# Patient Record
Sex: FEMALE | Race: WHITE | NOT HISPANIC OR LATINO | Employment: UNEMPLOYED | URBAN - METROPOLITAN AREA
[De-identification: names, ages, dates, MRNs, and addresses within clinical notes are randomized per-mention and may not be internally consistent; named-entity substitution may affect disease eponyms.]

---

## 2022-12-31 ENCOUNTER — APPOINTMENT (EMERGENCY)
Dept: CT IMAGING | Facility: HOSPITAL | Age: 37
End: 2022-12-31

## 2022-12-31 ENCOUNTER — HOSPITAL ENCOUNTER (EMERGENCY)
Facility: HOSPITAL | Age: 37
Discharge: HOME | End: 2022-12-31
Attending: EMERGENCY MEDICINE

## 2022-12-31 VITALS
SYSTOLIC BLOOD PRESSURE: 115 MMHG | DIASTOLIC BLOOD PRESSURE: 71 MMHG | TEMPERATURE: 99.4 F | OXYGEN SATURATION: 100 % | RESPIRATION RATE: 18 BRPM | HEART RATE: 75 BPM

## 2022-12-31 DIAGNOSIS — R45.1 AGITATION: Primary | ICD-10-CM

## 2022-12-31 LAB
ALBUMIN SERPL BCP-MCNC: 3.2 G/DL (ref 3.5–5)
ALP SERPL-CCNC: 80 U/L (ref 46–116)
ALT SERPL W P-5'-P-CCNC: 27 U/L (ref 12–78)
ANION GAP SERPL CALCULATED.3IONS-SCNC: 7 MMOL/L (ref 4–13)
AST SERPL W P-5'-P-CCNC: 18 U/L (ref 5–45)
BASOPHILS # BLD AUTO: 0.03 THOUSANDS/ÂΜL (ref 0–0.1)
BASOPHILS NFR BLD AUTO: 1 % (ref 0–1)
BILIRUB DIRECT SERPL-MCNC: 0.06 MG/DL (ref 0–0.2)
BILIRUB SERPL-MCNC: 0.2 MG/DL (ref 0.2–1)
BUN SERPL-MCNC: 13 MG/DL (ref 5–25)
CALCIUM SERPL-MCNC: 8.2 MG/DL (ref 8.3–10.1)
CHLORIDE SERPL-SCNC: 108 MMOL/L (ref 96–108)
CO2 SERPL-SCNC: 29 MMOL/L (ref 21–32)
CREAT SERPL-MCNC: 0.91 MG/DL (ref 0.6–1.3)
EOSINOPHIL # BLD AUTO: 0.23 THOUSAND/ÂΜL (ref 0–0.61)
EOSINOPHIL NFR BLD AUTO: 4 % (ref 0–6)
ERYTHROCYTE [DISTWIDTH] IN BLOOD BY AUTOMATED COUNT: 14.5 % (ref 11.6–15.1)
ETHANOL SERPL-MCNC: <3 MG/DL (ref 0–3)
FLUAV RNA RESP QL NAA+PROBE: NEGATIVE
FLUBV RNA RESP QL NAA+PROBE: NEGATIVE
GFR SERPL CREATININE-BSD FRML MDRD: 82 ML/MIN/1.73SQ M
GLUCOSE SERPL-MCNC: 89 MG/DL (ref 65–140)
HCT VFR BLD AUTO: 34.5 % (ref 34.8–46.1)
HGB BLD-MCNC: 11 G/DL (ref 11.5–15.4)
IMM GRANULOCYTES # BLD AUTO: 0.01 THOUSAND/UL (ref 0–0.2)
IMM GRANULOCYTES NFR BLD AUTO: 0 % (ref 0–2)
LYMPHOCYTES # BLD AUTO: 1.57 THOUSANDS/ÂΜL (ref 0.6–4.47)
LYMPHOCYTES NFR BLD AUTO: 30 % (ref 14–44)
MAGNESIUM SERPL-MCNC: 1.7 MG/DL (ref 1.6–2.6)
MCH RBC QN AUTO: 31 PG (ref 26.8–34.3)
MCHC RBC AUTO-ENTMCNC: 31.9 G/DL (ref 31.4–37.4)
MCV RBC AUTO: 97 FL (ref 82–98)
MONOCYTES # BLD AUTO: 0.5 THOUSAND/ÂΜL (ref 0.17–1.22)
MONOCYTES NFR BLD AUTO: 10 % (ref 4–12)
NEUTROPHILS # BLD AUTO: 2.84 THOUSANDS/ÂΜL (ref 1.85–7.62)
NEUTS SEG NFR BLD AUTO: 55 % (ref 43–75)
NRBC BLD AUTO-RTO: 0 /100 WBCS
PLATELET # BLD AUTO: 254 THOUSANDS/UL (ref 149–390)
PMV BLD AUTO: 9.2 FL (ref 8.9–12.7)
POTASSIUM SERPL-SCNC: 3.9 MMOL/L (ref 3.5–5.3)
PROT SERPL-MCNC: 6.7 G/DL (ref 6.4–8.4)
RBC # BLD AUTO: 3.55 MILLION/UL (ref 3.81–5.12)
RSV RNA RESP QL NAA+PROBE: NEGATIVE
SARS-COV-2 RNA RESP QL NAA+PROBE: NEGATIVE
SODIUM SERPL-SCNC: 144 MMOL/L (ref 135–147)
WBC # BLD AUTO: 5.18 THOUSAND/UL (ref 4.31–10.16)

## 2022-12-31 NOTE — ED NOTES
Patient provided the following #'s:    Elinor Madden - [address] [facility] ([place], [place]) (Nurse) - [phone]  Monico (Director) - [phone]  Graciela Barboza (Behavioral Specialist) - [phone]

## 2022-12-31 NOTE — ED PROVIDER NOTES
History  Chief Complaint   Patient presents with   • Psychiatric Evaluation     Per EMS, pt ran away from group home and found [place] side of highway  Pt oriented on arrival and reports SI without a plan without HI/AH/VH  Hx schizophrenia  • Altered Mental Status     Per reports from EMS pt completely oriented until ambulance arrived  Pt disoriented x4 and stating, "I dont know any of your questions  I am very confused"  31 YO female from [facility] presents with possible confusion  Per EMS patient was found on the side of the highway, was conversational with crew until she was brought aboard the ambulance, at which time she stated she was confused and did not answer questions, stating she did not remember  On initial physician evaluation patient offers no specific complaints, she is not answering questions, states she does not remember what she was doing today, does not know who she is or where she is, cannot recall falling  Patient has admitted to suicidality but no plan, unsuer regarding history of suicide attempt  History provided by:  Patient and EMS personnel   used: No    Psychiatric Evaluation  Presenting symptoms: agitation    Degree of incapacity (severity): Moderate  Onset quality:  Unable to specify  Progression:  Unable to specify  Relieved by:  Nothing  Exacerbated by: interactions at group home  Associated symptoms: no abdominal pain, no chest pain and no fatigue        None       History reviewed  No pertinent past medical history  History reviewed  No pertinent surgical history  History reviewed  No pertinent family history  I have reviewed and agree with the history as documented  E-Cigarette/Vaping     E-Cigarette/Vaping Substances          Review of Systems   Constitutional: Negative for chills, fatigue and fever  HENT: Negative for dental problem  Eyes: Negative for visual disturbance  Respiratory: Negative for shortness of breath  Cardiovascular: Negative for chest pain  Gastrointestinal: Negative for abdominal pain, diarrhea and vomiting  Genitourinary: Negative for dysuria and frequency  Musculoskeletal: Negative for arthralgias  Skin: Negative for rash  Neurological: Negative for dizziness, weakness and light-headedness  Psychiatric/Behavioral: Positive for agitation  Negative for behavioral problems and confusion  All other systems reviewed and are negative  Physical Exam  Physical Exam  Vitals and nursing note reviewed  Constitutional:       Appearance: Normal appearance  HENT:      Head: Normocephalic and atraumatic  Eyes:      Extraocular Movements: Extraocular movements intact  Conjunctiva/sclera: Conjunctivae normal    Cardiovascular:      Rate and Rhythm: Normal rate  Pulmonary:      Effort: Pulmonary effort is normal    Abdominal:      General: There is no distension  Musculoskeletal:         General: Normal range of motion  Cervical back: Normal range of motion  Skin:     Findings: No rash  Neurological:      General: No focal deficit present  Mental Status: She is alert  Cranial Nerves: No cranial nerve deficit     Psychiatric:         Mood and Affect: Mood normal          Vital Signs  ED Triage Vitals [12/31/22 1508]   Temperature Pulse Respirations Blood Pressure SpO2   99 4 °F (37 4 °C) 89 16 137/80 99 %      Temp Source Heart Rate Source Patient Position - Orthostatic VS BP Location FiO2 (%)   Oral Monitor Lying Right arm --      Pain Score       No Pain           Vitals:    12/31/22 1508 12/31/22 1600 12/31/22 1625   BP: 137/80 112/69 115/71   Pulse: 89 72 75   Patient Position - Orthostatic VS: Lying Lying Lying         Visual Acuity      ED Medications  Medications - No data to display    Diagnostic Studies  Results Reviewed     Procedure Component Value Units Date/Time    FLU/RSV/COVID - if FLU/RSV clinically relevant [[id number]]  (Normal) Collected: 12/31/22 7335 Lab Status: Final result Specimen: Nares from Nasopharyngeal Swab Updated: 12/31/22 1652     SARS-CoV-2 Negative     INFLUENZA A PCR Negative     INFLUENZA B PCR Negative     RSV PCR Negative    Narrative:      FOR PEDIATRIC PATIENTS - copy/paste COVID Guidelines URL to browser: [email or web address]    SARS-CoV-2 assay is a Nucleic Acid Amplification assay intended for the  qualitative detection of nucleic acid from SARS-CoV-2 in nasopharyngeal  swabs  Results are for the presumptive identification of SARS-CoV-2 RNA  Positive results are indicative of infection with SARS-CoV-2, the virus  causing COVID-19, but do not rule out bacterial infection or co-infection  with other viruses  Laboratories within the [place] and its  territories are required to report all positive results to the appropriate  public health authorities  Negative results do not preclude SARS-CoV-2  infection and should not be used as the sole basis for treatment or other  patient management decisions  Negative results must be combined with  clinical observations, patient history, and epidemiological information  This test has not been FDA cleared or approved  This test has been authorized by FDA under an Emergency Use Authorization  (EUA)  This test is only authorized for the duration of time the  declaration that circumstances exist justifying the authorization of the  emergency use of an in vitro diagnostic tests for detection of SARS-CoV-2  virus and/or diagnosis of COVID-19 infection under section 564(b)(1) of  the Act, 21 U  S C  157EGS-7(C)(7), unless the authorization is terminated  or revoked sooner  The test has been validated but independent review by FDA  and CLIA is pending  Test performed using [employer] GeneXpert: This RT-PCR assay targets N2,  a region unique to SARS-CoV-2   A conserved region in the E-gene was chosen  for pan-Sarbecovirus detection which includes SARS-CoV-2  According to CMS-2020-01-R, this platform meets the definition of high-throughput technology      Ethanol [[id number]]  (Normal) Collected: 12/31/22 1550    Lab Status: Final result Specimen: Blood from Arm, Right Updated: 12/31/22 1632     Ethanol Lvl <3 mg/dL     Basic metabolic panel [[id number]]  (Abnormal) Collected: 12/31/22 1550    Lab Status: Final result Specimen: Blood from Arm, Right Updated: 12/31/22 1619     Sodium 144 mmol/L      Potassium 3 9 mmol/L      Chloride 108 mmol/L      CO2 29 mmol/L      ANION GAP 7 mmol/L      BUN 13 mg/dL      Creatinine 0 91 mg/dL      Glucose 89 mg/dL      Calcium 8 2 mg/dL      eGFR 82 ml/min/1 73sq m     Narrative:      [place] guidelines for Chronic Kidney Disease (CKD):   •  Stage 1 with normal or high GFR (GFR > 90 mL/min/1 73 square meters)  •  Stage 2 Mild CKD (GFR = 60-89 mL/min/1 73 square meters)  •  Stage 3A Moderate CKD (GFR = 45-59 mL/min/1 73 square meters)  •  Stage 3B Moderate CKD (GFR = 30-44 mL/min/1 73 square meters)  •  Stage 4 Severe CKD (GFR = 15-29 mL/min/1 73 square meters)  •  Stage 5 End Stage CKD (GFR <15 mL/min/1 73 square meters)  Note: GFR calculation is accurate only with a steady state creatinine    Hepatic function panel [[id number]]  (Abnormal) Collected: 12/31/22 1550    Lab Status: Final result Specimen: Blood from Arm, Right Updated: 12/31/22 1619     Total Bilirubin 0 20 mg/dL      Bilirubin, Direct 0 06 mg/dL      Alkaline Phosphatase 80 U/L      AST 18 U/L      ALT 27 U/L      Total Protein 6 7 g/dL      Albumin 3 2 g/dL     Magnesium [[id number]]  (Normal) Collected: 12/31/22 1550    Lab Status: Final result Specimen: Blood from Arm, Right Updated: 12/31/22 1619     Magnesium 1 7 mg/dL     CBC and differential [[id number]]  (Abnormal) Collected: 12/31/22 1550    Lab Status: Final result Specimen: Blood from Arm, Right Updated: 12/31/22 1600     WBC 5 18 Thousand/uL      RBC 3 55 Million/uL Hemoglobin 11 0 g/dL      Hematocrit 34 5 %      MCV 97 fL      MCH 31 0 pg      MCHC 31 9 g/dL      RDW 14 5 %      MPV 9 2 fL      Platelets 622 Thousands/uL      nRBC 0 /100 WBCs      Neutrophils Relative 55 %      Immat GRANS % 0 %      Lymphocytes Relative 30 %      Monocytes Relative 10 %      Eosinophils Relative 4 %      Basophils Relative 1 %      Neutrophils Absolute 2 84 Thousands/µL      Immature Grans Absolute 0 01 Thousand/uL      Lymphocytes Absolute 1 57 Thousands/µL      Monocytes Absolute 0 50 Thousand/µL      Eosinophils Absolute 0 23 Thousand/µL      Basophils Absolute 0 03 Thousands/µL     Rapid drug screen, urine [[id number]]     Lab Status: No result Specimen: Urine                  CT head without contrast   Final Result by Dillan Mcdonald MD (12/31 1651)      No acute intracranial abnormality  Workstation performed: [id number]                    Procedures  Procedures         ED Course  ED Course as of 12/31/22 1737   Sat Dec 31, 2022   1612 Group home representative calling, stating she refused her medications this morning and walked out of the group home, at that time EMS was called to pick her up     [phone] with group home representative  Patient has expressed SI, despite this group home representative states he is capable of caring for the patient and comfortable with discharge  SBIRT 20yo+    Flowsheet Row Most Recent Value   SBIRT (25 yo +)    In order to provide better care to our patients, we are screening all of our patients for alcohol and drug use  Would it be okay to ask you these screening questions? Yes Filed at: 12/31/2022 1634   Initial Alcohol Screen: US AUDIT-C     1  How often do you have a drink containing alcohol? 0 Filed at: 12/31/2022 1634   2  How many drinks containing alcohol do you have on a typical day you are drinking? 0 Filed at: 12/31/2022 1634   3a  Male UNDER 65:  How often do you have five or more drinks on one occasion? 0 Filed at: 12/31/2022 1634   3b  FEMALE Any Age, or MALE 65+: How often do you have 4 or more drinks on one occassion? 0 Filed at: 12/31/2022 1634   Audit-C Score 0 Filed at: 12/31/2022 1634   TIANA: How many times in the past year have you    Used an illegal drug or used a prescription medication for non-medical reasons? Never Filed at: 12/31/2022 1634                    MDM  Number of Diagnoses or Management Options  Agitation: new and requires workup  Diagnosis management comments: 1  Confusion - Patient appears unwilling to give information, she is conversational but when asked questions states she does not know  No records in system  Will try to contact group home, will check CBC for anemia and leukocytosis, metabolic panel for electrolyte abnormalities and dehydration,  LFT's to assess GB dysfunction, lipase for pancreatitis, UDS, alcohol, CT head  Amount and/or Complexity of Data Reviewed  Clinical lab tests: ordered and reviewed  Tests in the radiology section of CPT®: reviewed and ordered  Obtain history from someone other than the patient: yes  Independent visualization of images, tracings, or specimens: yes        Disposition  Final diagnoses:   Agitation     Time reflects when diagnosis was documented in both MDM as applicable and the Disposition within this note     Time User Action Codes Description Comment    12/31/2022  5:04 PM [place]oir Officer Add [R45 1] Agitation       ED Disposition     ED Disposition   Discharge    Condition   Stable    Date/Time   Sat Dec 31, 2022  5:04 PM    Comment   Cecile Guzmán discharge to home/self care  Follow-up Information    None         There are no discharge medications for this patient  No discharge procedures on file      PDMP Review     None          ED Provider  Electronically Signed by           Ashleigh Cavazos MD  12/31/22 5023

## 2022-12-31 NOTE — ED NOTES
Pt not reliable historian and unable to verify medical hx or allergies      Arabella Hoang RN  12/31/22 2916

## 2022-12-31 NOTE — ED NOTES
Pt refusing to answer any triage questions or verify name and birthday     Melody Arriaga RN  12/31/22 8369

## 2022-12-31 NOTE — ED NOTES
Call placed to [address] [place] [address], spoke with Neil Mendez, who was able to locate the patient in the state system  Cecile Valdes  [facility] [place][note 2] Dr AINSLEY Bacon [facility]#      Nataly Saleh, [facility]  12/31/22  2330

## 2023-03-03 ENCOUNTER — HOSPITAL ENCOUNTER (EMERGENCY)
Facility: HOSPITAL | Age: 38
End: 2023-03-06
Attending: EMERGENCY MEDICINE

## 2023-03-03 DIAGNOSIS — F32.A DEPRESSION: Primary | ICD-10-CM

## 2023-03-03 LAB
AMPHETAMINES SERPL QL SCN: NEGATIVE
BARBITURATES UR QL: NEGATIVE
BENZODIAZ UR QL: NEGATIVE
COCAINE UR QL: NEGATIVE
ETHANOL EXG-MCNC: 0 MG/DL
EXT PREGNANCY TEST URINE: NEGATIVE
EXT. CONTROL: NORMAL
FLUAV RNA RESP QL NAA+PROBE: NEGATIVE
FLUBV RNA RESP QL NAA+PROBE: NEGATIVE
METHADONE UR QL: NEGATIVE
OPIATES UR QL SCN: NEGATIVE
OXYCODONE+OXYMORPHONE UR QL SCN: NEGATIVE
PCP UR QL: NEGATIVE
RSV RNA RESP QL NAA+PROBE: NEGATIVE
SARS-COV-2 RNA RESP QL NAA+PROBE: NEGATIVE
THC UR QL: NEGATIVE

## 2023-03-03 NOTE — ED NOTES
Crisis discussed treatment options  Patient not able to contract for safety due to SI and HI  Inpatient hospitalization recommended  Patient in agreement to sign the 201  201 prepared, signatures obtained  Referral faxed to Intake for the review

## 2023-03-03 NOTE — ED NOTES
Per group home representative, please contact Shy Pinedo [phone], for add  Info       Samra Booth, LASHAY  03/03/23 4776

## 2023-03-03 NOTE — ED NOTES
RN spoke with Alma Chowdaryhumaira from [facility] ([phone]) who states she has been working behaviorally with [address]  States Cecile eloped from group home on Wed night, became physically aggressive with staff, and laid down in the middle of the highway in suicide attempt  Police were called and pt able to be moved to side of road and taken back to group home  Per Alma Vaughan, over past 2 weeks pt has been having trouble regulating emotions, gender confusion, low impulse control, increased aggressive behavior (verbally and physically), as well as increased verbalization of SI  Pt verbalized to Alma Vaughan plan to elope this weekend and commit suicide  States no recent med adjustments and Alma Alexus wondering if meds may be able to be adjusted        Adrienne Butts RN  03/03/23 7809

## 2023-03-03 NOTE — LETTER
[facility] EMERGENCY DEPARTMENT  [address] [address]  Dept: [phone]      EMTALA TRANSFER CONSENT    NAME Cecile Guzmán                                         1985                              MRN [id number]    I have been informed of my rights regarding examination, treatment, and transfer   by Dr Everett Kwan DO    Benefits: Specialized equipment and/or services available at the receiving facility (Include comment)________________________    Risks: Potential for delay in receiving treatment      Transfer Request   I acknowledge that my medical condition has been evaluated and explained to me by the emergency department physician or other qualified medical person and/or my attending physician who has recommended and offered to me further medical examination and treatment  I understand the Hospital's obligation with respect to the treatment and stabilization of my emergency medical condition  I nevertheless request to be transferred  I release the Hospital, the doctor, and any other persons caring for me from all responsibility or liability for any injury or ill effects that may result from my transfer and agree to accept all responsibility for the consequences of my choice to transfer, rather than receive stabilizing treatment at the Hospital  I understand that because the transfer is my request, my insurance may not provide reimbursement for the services  The Hospital will assist and direct me and my family in how to make arrangements for transfer, but the hospital is not liable for any fees charged by the transport service  In spite of this understanding, I refuse to consent to further medical examination and treatment which has been offered to me, and request transfer to  [address], [address] : Dorothea Cordero   I authorize the performance of emergency medical procedures and treatments upon me in both transit and upon arrival at the receiving facility  Additionally, I authorize the release of any and all medical records to the receiving facility and request they be transported with me, if possible  I authorize the performance of emergency medical procedures and treatments upon me in both transit and upon arrival at the receiving facility  Additionally, I authorize the release of any and all medical records to the receiving facility and request they be transported with me, if possible  I understand that the safest mode of transportation during a medical emergency is an ambulance and that the Hospital advocates the use of this mode of transport  Risks of traveling to the receiving facility by car, including absence of medical control, life sustaining equipment, such as oxygen, and medical personnel has been explained to me and I fully understand them  (KEVIN CORRECT BOX BELOW)  [  ]  I consent to the stated transfer and to be transported by ambulance/helicopter  [  ]  I consent to the stated transfer, but refuse transportation by ambulance and accept full responsibility for my transportation by car  I understand the risks of non-ambulance transfers and I exonerate the Hospital and its staff from any deterioration in my condition that results from this refusal     X___________________________________________    DATE  23  TIME________  Signature of patient or legally responsible individual signing on patient behalf           RELATIONSHIP TO PATIENT_________________________          Provider Certification    NAME Cecile Guzmán                                         1985                              MRN [id number]    A medical screening exam was performed on the above named patient  Based on the examination:    Condition Necessitating Transfer The encounter diagnosis was Depression      Patient Condition: The patient has been stabilized such that within reasonable medical probability, no material deterioration of the patient condition or the condition of the unborn child(amanda) is likely to result from the transfer    Reason for Transfer: Level of Care needed not available at this facility    Transfer Requirements: [id number]   · Space available and qualified personnel available for treatment as acknowledged by Nancy Genao MA  · Agreed to accept transfer and to provide appropriate medical treatment as acknowledged by       [employer]  · Appropriate medical records of the examination and treatment of the patient are provided at the time of transfer   [address] _______  · Transfer will be performed by qualified personnel from [facility]  and appropriate transfer equipment as required, including the use of necessary and appropriate life support measures  Provider Certification: I have examined the patient and explained the following risks and benefits of being transferred/refusing transfer to the patient/family:  General risk, such as traffic hazards, adverse weather conditions, rough terrain or turbulence, possible failure of equipment (including vehicle or aircraft), or consequences of actions of persons outside the control of the transport personnel      Based on these reasonable risks and benefits to the patient and/or the unborn child(amanda), and based upon the information available at the time of the patient’s examination, I certify that the medical benefits reasonably to be expected from the provision of appropriate medical treatments at another medical facility outweigh the increasing risks, if any, to the individual’s medical condition, and in the case of labor to the unborn child, from effecting the transfer      X____________________________________________ DATE 03/06/23        TIME_______      ORIGINAL - SEND TO MEDICAL RECORDS   COPY - SEND WITH PATIENT DURING TRANSFER

## 2023-03-03 NOTE — ED PROVIDER NOTES
History  Chief Complaint   Patient presents with   • Psychiatric Evaluation     Pt reports thinking SI thoughts, reports in the past week she "thought about jumping off a bridge, but got scared of the heights", "almost froze to death by being outside without a coat", and "was found by the police laying in the road"  Per group home representative, please contact Jericho Huerta [phone], for add  Info  Patient is a 53-year-old nonbinary presents for evaluation of suicidal thoughts  Lives in a group home and says that she has been increasingly depressed with thoughts to jump off a bridge or jump in traffic  She denies any attempted self-harm  She has homicidal ideations  She has auditory visual [place]  She denies any physical complaints at this time  Hoping to sign 201  None       No past medical history on file  No past surgical history on file  No family history on file  I have reviewed and agree with the history as documented  E-Cigarette/Vaping     E-Cigarette/Vaping Substances          Review of Systems   Constitutional: Negative for fever  HENT: Negative for sore throat  Respiratory: Negative for shortness of breath  Cardiovascular: Negative for chest pain  Gastrointestinal: Negative for abdominal pain  Genitourinary: Negative for dysuria  Musculoskeletal: Negative for back pain  Skin: Negative for rash  Neurological: Negative for light-headedness  Psychiatric/Behavioral: Positive for suicidal ideas  Negative for agitation  All other systems reviewed and are negative  Physical Exam  Physical Exam  Vitals reviewed  Constitutional:       General: Cecile Guzmán is not in acute distress  Appearance: Cecile Guzmán is well-developed  HENT:      Head: Normocephalic  Eyes:      Pupils: Pupils are equal, round, and reactive to light  Cardiovascular:      Rate and Rhythm: Normal rate and regular rhythm  Heart sounds: Normal heart sounds  No murmur heard  No friction rub  No gallop  Pulmonary:      Effort: Pulmonary effort is normal       Breath sounds: Normal breath sounds  Abdominal:      General: Bowel sounds are normal  There is no distension  Palpations: Abdomen is soft  Tenderness: There is no abdominal tenderness  There is no guarding  Musculoskeletal:         General: Normal range of motion  Cervical back: Normal range of motion and neck supple  Skin:     Capillary Refill: Capillary refill takes less than 2 seconds  Neurological:      Mental Status: Cecile Daniel is alert and oriented to person, place, and time  Cranial Nerves: No cranial nerve deficit  Sensory: No sensory deficit  Motor: No abnormal muscle tone  Psychiatric:         Behavior: Behavior normal          Thought Content: Thought content normal          Judgment: Judgment normal          Vital Signs  ED Triage Vitals [03/03/23 1258]   Temperature Pulse Respirations Blood Pressure SpO2   98 °F (36 7 °C) 80 16 96/60 98 %      Temp Source Heart Rate Source Patient Position - Orthostatic VS BP Location FiO2 (%)   Temporal -- -- -- --      Pain Score       7           Vitals:    03/03/23 1258   BP: 96/60   Pulse: 80         Visual Acuity      ED Medications  Medications - No data to display    Diagnostic Studies  Results Reviewed     Procedure Component Value Units Date/Time    Rapid drug screen, urine [[id number]]  (Normal) Collected: 03/03/23 1429    Lab Status: Final result Specimen: Urine, Clean Catch Updated: 03/03/23 1451     Amph/Meth UR Negative     Barbiturate Ur Negative     Benzodiazepine Urine Negative     Cocaine Urine Negative     Methadone Urine Negative     Opiate Urine Negative     PCP Ur Negative     THC Urine Negative     Oxycodone Urine Negative    Narrative:      FOR MEDICAL PURPOSES ONLY  IF CONFIRMATION NEEDED PLEASE CONTACT THE LAB WITHIN 5 DAYS      Drug Screen Cutoff Levels:  AMPHETAMINE/METHAMPHETAMINES  1000 ng/mL  BARBITURATES     200 ng/mL  BENZODIAZEPINES     200 ng/mL  COCAINE      300 ng/mL  METHADONE      300 ng/mL  OPIATES      300 ng/mL  PHENCYCLIDINE     25 ng/mL  THC       50 ng/mL  OXYCODONE      100 ng/mL    POCT pregnancy, urine [[id number]]  (Normal) Resulted: 03/03/23 1442    Lab Status: Final result Updated: 03/03/23 1443     EXT Preg Test, Ur Negative     Control Valid    FLU/RSV/COVID - if FLU/RSV clinically relevant [[id number]]  (Normal) Collected: 03/03/23 1347    Lab Status: Final result Specimen: Nares from Nose Updated: 03/03/23 1429     SARS-CoV-2 Negative     INFLUENZA A PCR Negative     INFLUENZA B PCR Negative     RSV PCR Negative    Narrative:      FOR PEDIATRIC PATIENTS - copy/paste COVID Guidelines URL to browser: [email or web address]    SARS-CoV-2 assay is a Nucleic Acid Amplification assay intended for the  qualitative detection of nucleic acid from SARS-CoV-2 in nasopharyngeal  swabs  Results are for the presumptive identification of SARS-CoV-2 RNA  Positive results are indicative of infection with SARS-CoV-2, the virus  causing COVID-19, but do not rule out bacterial infection or co-infection  with other viruses  Laboratories within the [place] and its  territories are required to report all positive results to the appropriate  public health authorities  Negative results do not preclude SARS-CoV-2  infection and should not be used as the sole basis for treatment or other  patient management decisions  Negative results must be combined with  clinical observations, patient history, and epidemiological information  This test has not been FDA cleared or approved  This test has been authorized by FDA under an Emergency Use Authorization  (EUA)   This test is only authorized for the duration of time the  declaration that circumstances exist justifying the authorization of the  emergency use of an in vitro diagnostic tests for detection of SARS-CoV-2  virus and/or diagnosis of COVID-19 infection under section 564(b)(1) of  the Act, 21 U  S C  486SNC-8(Z)(9), unless the authorization is terminated  or revoked sooner  The test has been validated but independent review by FDA  and CLIA is pending  Test performed using [employer] GeneXpert: This RT-PCR assay targets N2,  a region unique to SARS-CoV-2  A conserved region in the E-gene was chosen  for pan-Sarbecovirus detection which includes SARS-CoV-2  According to CMS-2020-01-R, this platform meets the definition of high-throughput technology  POCT alcohol breath test [[id number]]  (Normal) Resulted: 03/03/23 1345    Lab Status: Final result Updated: 03/03/23 1345     EXTBreath Alcohol 0 000                 No orders to display              Procedures  Procedures         ED Course                               SBIRT 22yo+    Flowsheet Row Most Recent Value   SBIRT (25 yo +)    In order to provide better care to our patients, we are screening all of our patients for alcohol and drug use  Would it be okay to ask you these screening questions? Yes Filed at: 03/03/2023 1330   Initial Alcohol Screen: US AUDIT-C     1  How often do you have a drink containing alcohol? 0 Filed at: 03/03/2023 1330   2  How many drinks containing alcohol do you have on a typical day you are drinking? 0 Filed at: 03/03/2023 1330   3a  Male UNDER 65: How often do you have five or more drinks on one occasion? 0 Filed at: 03/03/2023 1330   3b  FEMALE Any Age, or MALE 65+: How often do you have 4 or more drinks on one occassion? 0 Filed at: 03/03/2023 1330   Audit-C Score 0 Filed at: 03/03/2023 1330   TIANA: How many times in the past year have you    Used an illegal drug or used a prescription medication for non-medical reasons? Never Filed at: 03/03/2023 1330                    Medical Decision Making  Patient is a 62-year-old nonbinary presents for evaluation of suicidal thoughts    Pending crisis evaluation at time of signout  Likely 201, stable at the time of signout    Amount and/or Complexity of Data Reviewed  Labs: ordered  Risk  Decision regarding hospitalization  Disposition  Final diagnoses:   Depression     Time reflects when diagnosis was documented in both MDM as applicable and the Disposition within this note     Time User Action Codes Description Comment    3/3/2023  1:30 PM Navdeep KELLY] Depression       ED Disposition     ED Disposition   Transfer to Behavioral Health Condition   --    Date/Time   Fri Mar 3, 2023  1:30 PM    Comment   Cecile SIMON  Guzmán should be transferred out to [address] [place] [address] and has been medically cleared  Follow-up Information    None         Patient's Medications    No medications on file       No discharge procedures on file      PDMP Review     None          ED Provider  Electronically Signed by           Jonathan Trimble MD  03/03/23 5578
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
## 2023-03-04 RX ORDER — QUETIAPINE FUMARATE 100 MG/1
300 TABLET, FILM COATED ORAL
Status: DISCONTINUED | OUTPATIENT
Start: 2023-03-04 | End: 2023-03-06 | Stop reason: HOSPADM

## 2023-03-04 RX ORDER — ALBUTEROL SULFATE 90 UG/1
2 AEROSOL, METERED RESPIRATORY (INHALATION) EVERY 4 HOURS PRN
Status: DISCONTINUED | OUTPATIENT
Start: 2023-03-04 | End: 2023-03-06 | Stop reason: HOSPADM

## 2023-03-04 RX ORDER — PANTOPRAZOLE SODIUM 40 MG/1
40 TABLET, DELAYED RELEASE ORAL
Status: DISCONTINUED | OUTPATIENT
Start: 2023-03-05 | End: 2023-03-06 | Stop reason: HOSPADM

## 2023-03-04 RX ORDER — PRAZOSIN HYDROCHLORIDE 1 MG/1
4 CAPSULE ORAL
Status: DISCONTINUED | OUTPATIENT
Start: 2023-03-04 | End: 2023-03-06 | Stop reason: HOSPADM

## 2023-03-04 RX ORDER — CARBAMAZEPINE 200 MG/1
200 TABLET ORAL 3 TIMES DAILY
Status: DISCONTINUED | OUTPATIENT
Start: 2023-03-04 | End: 2023-03-06 | Stop reason: HOSPADM

## 2023-03-04 RX ORDER — LEVOTHYROXINE SODIUM 0.07 MG/1
75 TABLET ORAL
Status: DISCONTINUED | OUTPATIENT
Start: 2023-03-05 | End: 2023-03-06 | Stop reason: HOSPADM

## 2023-03-04 RX ORDER — LORAZEPAM 1 MG/1
1 TABLET ORAL
Status: DISCONTINUED | OUTPATIENT
Start: 2023-03-04 | End: 2023-03-06 | Stop reason: HOSPADM

## 2023-03-04 RX ORDER — BUSPIRONE HYDROCHLORIDE 5 MG/1
10 TABLET ORAL 2 TIMES DAILY
Status: DISCONTINUED | OUTPATIENT
Start: 2023-03-04 | End: 2023-03-06 | Stop reason: HOSPADM

## 2023-03-04 RX ADMIN — BUSPIRONE HYDROCHLORIDE 10 MG: 5 TABLET ORAL at 17:29

## 2023-03-04 RX ADMIN — LORAZEPAM 1 MG: 1 TABLET ORAL at 23:25

## 2023-03-04 RX ADMIN — CARBAMAZEPINE 200 MG: 200 TABLET ORAL at 17:48

## 2023-03-04 RX ADMIN — QUETIAPINE FUMARATE 300 MG: 100 TABLET ORAL at 23:25

## 2023-03-04 RX ADMIN — CARBAMAZEPINE 200 MG: 200 TABLET ORAL at 23:25

## 2023-03-04 NOTE — ED NOTES
Bed search:    Vashti-Per Yanely-no beds  Jose-Per Alyse-no beds  Vaughn-no answer  [facility]-left voice message;   Eva-Per Kei-no beds  In [facility] sheet and 201 faxed to Manod Deluca For bed review

## 2023-03-04 NOTE — ED NOTES
Lazaro Suicide Risk Assessment deferred, as unable to assess while patient sleeping  Behavioral Health Assessment deferred as patient is sleeping and would benefit from additional rest   Vital signs deferred until patient awake, no signs or symptoms of respiratory distress at this time  Once patient is awake and able to participate, will complete assessments        Cecy Kowalski RN  03/04/23 3563

## 2023-03-04 NOTE — ED NOTES
1:1 observer stated patient started period  Was given new sheets, new clothes, and warm wipes to clean up   Pt is cooperative and pleasant at the time     Oscar Mayo RN  03/04/23 0147

## 2023-03-04 NOTE — ED NOTES
[facility] Su[facility] Assessment deferred, as unable to assess while patient sleeping  Behavioral Health Assessment deferred as patient is sleeping and would benefit from additional rest   Vital signs deferred until patient awake, no signs or symptoms of respiratory distress at this time  Once patient is awake and able to participate, will complete assessments         Miller Redmond RN  03/04/23 9416

## 2023-03-04 NOTE — ED NOTES
Pt placed hospital socks on ER and was talking in a baby-like voice  Told the patient observer that she has 6 different personalities and this is her "13 yr old one"       Ardell Cabot, RN  03/04/23 5249

## 2023-03-05 LAB
ANION GAP SERPL CALCULATED.3IONS-SCNC: 7 MMOL/L (ref 4–13)
BASOPHILS # BLD AUTO: 0.03 THOUSANDS/ÂΜL (ref 0–0.1)
BASOPHILS NFR BLD AUTO: 1 % (ref 0–1)
BUN SERPL-MCNC: 14 MG/DL (ref 5–25)
CALCIUM SERPL-MCNC: 9.2 MG/DL (ref 8.4–10.2)
CHLORIDE SERPL-SCNC: 102 MMOL/L (ref 96–108)
CO2 SERPL-SCNC: 31 MMOL/L (ref 21–32)
CREAT SERPL-MCNC: 1 MG/DL (ref 0.6–1.3)
EOSINOPHIL # BLD AUTO: 0.15 THOUSAND/ÂΜL (ref 0–0.61)
EOSINOPHIL NFR BLD AUTO: 3 % (ref 0–6)
ERYTHROCYTE [DISTWIDTH] IN BLOOD BY AUTOMATED COUNT: 14 % (ref 11.6–15.1)
GLUCOSE SERPL-MCNC: 116 MG/DL (ref 65–140)
HCT VFR BLD AUTO: 37.7 % (ref 36.5–46.1)
HGB BLD-MCNC: 12.3 G/DL (ref 12–15.4)
IMM GRANULOCYTES # BLD AUTO: 0.01 THOUSAND/UL (ref 0–0.2)
IMM GRANULOCYTES NFR BLD AUTO: 0 % (ref 0–2)
LYMPHOCYTES # BLD AUTO: 1.96 THOUSANDS/ÂΜL (ref 0.6–4.47)
LYMPHOCYTES NFR BLD AUTO: 35 % (ref 14–44)
MCH RBC QN AUTO: 31.9 PG (ref 26.8–34.3)
MCHC RBC AUTO-ENTMCNC: 32.6 G/DL (ref 31.4–37.4)
MCV RBC AUTO: 98 FL (ref 82–98)
MONOCYTES # BLD AUTO: 0.39 THOUSAND/ÂΜL (ref 0.17–1.22)
MONOCYTES NFR BLD AUTO: 7 % (ref 4–12)
NEUTROPHILS # BLD AUTO: 3.09 THOUSANDS/ÂΜL (ref 1.85–7.62)
NEUTS SEG NFR BLD AUTO: 54 % (ref 43–75)
NRBC BLD AUTO-RTO: 0 /100 WBCS
PLATELET # BLD AUTO: 269 THOUSANDS/UL (ref 149–390)
PMV BLD AUTO: 9 FL (ref 8.9–12.7)
POTASSIUM SERPL-SCNC: 3.4 MMOL/L (ref 3.5–5.3)
RBC # BLD AUTO: 3.86 MILLION/UL (ref 3.88–5.12)
SODIUM SERPL-SCNC: 140 MMOL/L (ref 135–147)
WBC # BLD AUTO: 5.63 THOUSAND/UL (ref 4.31–10.16)

## 2023-03-05 RX ADMIN — PANTOPRAZOLE SODIUM 40 MG: 40 TABLET, DELAYED RELEASE ORAL at 06:55

## 2023-03-05 RX ADMIN — LEVOTHYROXINE SODIUM 75 MCG: 75 TABLET ORAL at 06:55

## 2023-03-05 RX ADMIN — QUETIAPINE FUMARATE 300 MG: 100 TABLET ORAL at 22:59

## 2023-03-05 RX ADMIN — PRAZOSIN HYDROCHLORIDE 4 MG: 1 CAPSULE ORAL at 22:59

## 2023-03-05 RX ADMIN — LORAZEPAM 1 MG: 1 TABLET ORAL at 22:59

## 2023-03-05 RX ADMIN — CARBAMAZEPINE 200 MG: 200 TABLET ORAL at 23:00

## 2023-03-05 RX ADMIN — CARBAMAZEPINE 200 MG: 200 TABLET ORAL at 18:21

## 2023-03-05 RX ADMIN — BUSPIRONE HYDROCHLORIDE 10 MG: 5 TABLET ORAL at 09:13

## 2023-03-05 RX ADMIN — CARBAMAZEPINE 200 MG: 200 TABLET ORAL at 09:13

## 2023-03-05 RX ADMIN — BUSPIRONE HYDROCHLORIDE 10 MG: 5 TABLET ORAL at 18:21

## 2023-03-05 NOTE — ED NOTES
Lazaro Suicide Risk Assessment deferred, as unable to assess while patient sleeping  Behavioral Health Assessment deferred as patient is sleeping and would benefit from additional rest   Vital signs deferred until patient awake, no signs or symptoms of respiratory distress at this time  Once patient is awake and able to participate, will complete assessments         Harman Fleming RN  03/05/23 4638

## 2023-03-05 NOTE — ED NOTES
Patient is accepted at [facility]  Patient is accepted by Dr Mary Alonso per [facility]  NPI [id number], Tax ID [id number]  UR is Devon Gaines (p) [phone], (f) [phone]    Transportation [address] [facility] wants information regarding vitals faxed over as well as labwork  They would also like the 201 with the doctor's name written the signature as well as credentials  Nurse report is to be called to [phone] prior to patient transfer

## 2023-03-05 NOTE — ED NOTES
Lazaro Suicide Risk Assessment deferred, as unable to assess while patient sleeping  Behavioral Health Assessment deferred as patient is sleeping and would benefit from additional rest   Vital signs deferred until patient awake, no signs or symptoms of respiratory distress at this time  Once patient is awake and able to participate, will complete assessments        Luanne Martinez RN  03/05/23 5269

## 2023-03-05 NOTE — ED NOTES
CIS received call from [facility] at [facility]  Reported that patient can be accepted anytime after 1300  Reported that pre-cert will need to be completed and the information called back when received
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
## 2023-03-05 NOTE — ED CARE HANDOFF
Emergency Department Sign Out Note        Sign out and transfer of care from Dr Delonte Mckeon See Separate Emergency Department note  The patient, Cecile Guzmán, was evaluated by the previous provider for psych eval     Workup Completed:  Clearance labs    ED Course / Workup Pending (followup): Medically cleared by previous provider, 201 signed, and signed out to me pending placement  No issues overnight  ED Course as of 03/05/23 0634   Sat Mar 04, 2023   0003 Would need 302     Procedures  MDM        Disposition  Final diagnoses:   Depression     Time reflects when diagnosis was documented in both MDM as applicable and the Disposition within this note     Time User Action Codes Description Comment    3/3/2023  1:30 PM Cyndy Reynoso Add [F32  A] Depression       ED Disposition     ED Disposition   Transfer to Behavioral Health Condition   --    Date/Time   Fri Mar 3, 2023  1:30 PM    Comment   Cecile Guzmán should be transferred out to [facility] and has been medically cleared  MD Documentation    Flowsheet Row Most Recent Value   Sending MD Caleb Head      Follow-up Information    None       Patient's Medications    No medications on file     No discharge procedures on file         ED Provider  Electronically Signed by     Arielle Bejarano MD  03/05/23 9297

## 2023-03-05 NOTE — ED NOTES
[facility]azaro Suicide Risk Assessment deferred, as unable to assess while patient sleeping  Behavioral Health Assessment deferred as patient is sleeping and would benefit from additional rest   Vital signs deferred until patient awake, no signs or symptoms of respiratory distress at this time  Once patient is awake and able to participate, will complete assessments        Shayne Rios RN  03/05/23 3619

## 2023-03-05 NOTE — ED NOTES
Lazaro Suicide Risk Assessment deferred, as unable to assess while patient sleeping  Behavioral Health Assessment deferred as patient is sleeping and would benefit from additional rest   Vital signs deferred until patient awake, no signs or symptoms of respiratory distress at this time  Once patient is awake and able to participate, will complete assessments         Pawan Nielson RN  03/05/23 1500

## 2023-03-05 NOTE — ED NOTES
Bed search continued at this time:    Byron Tinoco- adult female beds available  Phill Rashel- Adult female beds available  [facility]- no answer x2  [facility]- no adult beds  [facility]- no adult beds  Adela Olson- no beds, call after 9AM for discharges  Ping Paredes- adult male and female beds available; no adolescent beds  Flaquita Brown- no beds until Monday    Clinical faxed to [facility], [phone] for review   Bed search to continue in AM

## 2023-03-05 NOTE — ED NOTES
Lazaro Suicide Risk Assessment deferred, as unable to assess while patient sleeping  Behavioral Health Assessment deferred as patient is sleeping and would benefit from additional rest   Vital signs deferred until patient awake, no signs or symptoms of respiratory distress at this time  Once patient is awake and able to participate, will complete assessments         Yuriy Javier RN  03/05/23 2715

## 2023-03-05 NOTE — ED NOTES
Pt given breakfast, said they will eat once they wake up   Tray put at bedside     Tasha Farrell  03/05/23 0801

## 2023-03-05 NOTE — ED NOTES
Received call from Radha Bartholomew at [facility] - patient is denied - no appropriate bed

## 2023-03-05 NOTE — ED NOTES
This writer watched as pt fell onto floor  This writer then went into ask if pt was okay and they stated that they hit their head  This writer asked if anything else hurt and if pt needed help getting back onto bed  The pt stated that they fell because their head was stuck between the bed and the wall  This writer then notified nurse       Trell Clemons  03/04/23 2047

## 2023-03-05 NOTE — ED NOTES
Called [facility] [phone] to begin pre-cert  Spoke with Chemo Barriga and provided needed information  Care Manager will call back to complete pre-cert
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
## 2023-03-05 NOTE — ED NOTES
Patient appeared to fall off the bed witnessed by technician  RN to bedside for assessment  Provider aware  No LOC, no thinners  No obvious inj       Kandi Burch RN  03/04/23 2072

## 2023-03-06 VITALS
RESPIRATION RATE: 18 BRPM | OXYGEN SATURATION: 97 % | HEIGHT: 66 IN | WEIGHT: 234 LBS | TEMPERATURE: 98.9 F | HEART RATE: 67 BPM | SYSTOLIC BLOOD PRESSURE: 114 MMHG | BODY MASS INDEX: 37.61 KG/M2 | DIASTOLIC BLOOD PRESSURE: 65 MMHG

## 2023-03-06 RX ADMIN — CARBAMAZEPINE 200 MG: 200 TABLET ORAL at 08:15

## 2023-03-06 RX ADMIN — BUSPIRONE HYDROCHLORIDE 10 MG: 5 TABLET ORAL at 08:15

## 2023-03-06 RX ADMIN — PANTOPRAZOLE SODIUM 40 MG: 40 TABLET, DELAYED RELEASE ORAL at 08:15

## 2023-03-06 RX ADMIN — LEVOTHYROXINE SODIUM 75 MCG: 75 TABLET ORAL at 08:15

## 2023-03-06 NOTE — ED NOTES
Lazaro Suicide Risk Assessment deferred, as unable to assess while patient sleeping  Behavioral Health Assessment deferred as patient is sleeping and would benefit from additional rest   Vital signs deferred until patient awake, no signs or symptoms of respiratory distress at this time  Once patient is awake and able to participate, will complete assessments         Vero Warren RN  03/06/23 9323

## 2023-03-06 NOTE — ED NOTES
Call to [facility] at [facility] letting her know that pre-certt will need to occur during normal business hours    Per [facility] will hold bed for after 9am   Crisis to call with pre-cert authorization on or after 9am

## 2023-03-06 NOTE — ED NOTES
Spoke with Aric from [facility] who stated they completed the authorization for [facility] will need to be completed and transport can be arranged  Transport logged with round trip

## 2023-03-06 NOTE — ED NOTES
Lazaro Suicide Risk Assessment deferred, as unable to assess while patient sleeping  Behavioral Health Assessment deferred as patient is sleeping and would benefit from additional rest   Vital signs deferred until patient awake, no signs or symptoms of respiratory distress at this time  Once patient is awake and able to participate, will complete assessments         Charanjit Cohen RN  03/06/23 0636

## 2023-03-06 NOTE — ED NOTES
Attempted to complete 2100 behavorial assessments however patient is minimally cooperative with assessment  1:1 remains at bedside        Natali Mcnair RN  03/05/23 6601

## 2023-03-06 NOTE — ED CARE HANDOFF
Emergency Department Sign Out Note        Sign out and transfer of care from Dr Jenna Ochoa See Separate Emergency Department note  The patient, Cecile Guzmán, was evaluated by the previous provider for psych eval     Workup Completed:  Clearance labs    ED Course / Workup Pending (followup):  201 signed, pending placement would need 302  Medically cleared by previous provider  ED Course as of 03/06/23 0249   Sat Mar 04, 2023   0003 Would need 302     Procedures  MDM        Disposition  Final diagnoses:   Depression     Time reflects when diagnosis was documented in both MDM as applicable and the Disposition within this note     Time User Action Codes Description Comment    3/3/2023  1:30 PM Derek Garcia Add [F32  A] Depression       ED Disposition     ED Disposition   Transfer to Behavioral Health Condition   --    Date/Time   Fri Mar 3, 2023  1:30 PM    Comment   Cecile Guzmán should be transferred out to [facility] and has been medically cleared  MD Documentation    Flowsheet Row Most Recent Value   Sending MD Any Harding      Follow-up Information    None       Patient's Medications    No medications on file     No discharge procedures on file         ED Provider  Electronically Signed by     Regina Mon MD  03/06/23 0927

## 2023-03-06 NOTE — ED NOTES
Call placed to patient insurance jess, spoke with Priscilla bazzi, accepting facility to call upon patients arrival      Spoke with Toy Ashraf at [facility] and provided authorization number and new ETA,  now at 15 (noon)

## 2023-03-06 NOTE — ED NOTES
Lazaro Suicide Risk Assessment deferred, as unable to assess while patient sleeping  Behavioral Health Assessment deferred as patient is sleeping and would benefit from additional rest   Vital signs deferred until patient awake, no signs or symptoms of respiratory distress at this time  Once patient is awake and able to participate, will complete assessments         Antonio Noyola, LASHAY  03/06/23 8477

## 2023-04-23 ENCOUNTER — HOSPITAL ENCOUNTER (EMERGENCY)
Facility: HOSPITAL | Age: 38
Discharge: HOME | End: 2023-04-24
Attending: EMERGENCY MEDICINE | Admitting: EMERGENCY MEDICINE

## 2023-04-23 VITALS
SYSTOLIC BLOOD PRESSURE: 106 MMHG | HEART RATE: 85 BPM | RESPIRATION RATE: 18 BRPM | DIASTOLIC BLOOD PRESSURE: 72 MMHG | OXYGEN SATURATION: 94 % | TEMPERATURE: 97.8 F

## 2023-04-23 DIAGNOSIS — F63.9 IMPULSE DISORDER: Primary | ICD-10-CM

## 2023-04-23 LAB — ETHANOL EXG-MCNC: 0 MG/DL

## 2023-04-24 NOTE — ED PROVIDER NOTES
"History  Chief Complaint   Patient presents with   • Suicidal     Patient arrives via EMS from group home  EMS reports patient is unhappy with group home she resides at and began texting police threatening to harm herself  Patient has plan to swallow multiple metal objects  Reports she would rather kill herself before someone at the group home can  29 yo F arrives by EMS from group home where she resides for mental health care, with autism, depression, having apparently sent messages by text with SI with plan to ingest \"small metal objects\", citing ongoing issues with her \"group home stupidity\", and ongoing health concerns and depression issues  None       History reviewed  No pertinent past medical history  History reviewed  No pertinent surgical history  History reviewed  No pertinent family history  I have reviewed and agree with the history as documented  E-Cigarette/Vaping     E-Cigarette/Vaping Substances          Review of Systems    Physical Exam  Physical Exam  Vitals and nursing note reviewed  Constitutional:       Appearance: Cecile Guzmán is well-developed  HENT:      Head: Normocephalic and atraumatic  Right Ear: External ear normal       Left Ear: External ear normal       Nose: Nose normal    Eyes:      Conjunctiva/sclera: Conjunctivae normal       Pupils: Pupils are equal, round, and reactive to light  Cardiovascular:      Rate and Rhythm: Normal rate  Pulmonary:      Effort: Pulmonary effort is normal    Abdominal:      Tenderness: There is no abdominal tenderness  Musculoskeletal:         General: Normal range of motion  Cervical back: Normal range of motion and neck supple  Skin:     General: Skin is warm and dry  Capillary Refill: Capillary refill takes less than 2 seconds  Neurological:      Mental Status: Cecile Landers is alert and oriented to person, place, and time  Cranial Nerves: No cranial nerve deficit        " Coordination: Coordination normal    Psychiatric:         Behavior: Behavior normal          Thought Content: Thought content normal          Judgment: Judgment normal          Vital Signs  ED Triage Vitals   Temperature Pulse Respirations Blood Pressure SpO2   04/23/23 2320 04/23/23 2320 04/23/23 2320 04/23/23 2320 04/23/23 2320   97 8 °F (36 6 °C) 85 18 106/72 94 %      Temp Source Heart Rate Source Patient Position - Orthostatic VS BP Location FiO2 (%)   04/23/23 2320 04/23/23 2320 04/23/23 2320 04/23/23 2320 --   Oral Monitor Lying Right arm       Pain Score       04/23/23 2351       7           Vitals:    04/23/23 2320   BP: 106/72   Pulse: 85   Patient Position - Orthostatic VS: Lying         Visual Acuity      ED Medications  Medications - No data to display    Diagnostic Studies  Results Reviewed     Procedure Component Value Units Date/Time    POCT alcohol breath test [[id number]]  (Normal) Resulted: 04/23/23 2352    Lab Status: Final result Updated: 04/23/23 2352     EXTBreath Alcohol 0 00    Rapid drug screen, urine [[id number]]     Lab Status: No result Specimen: Urine                  No orders to display              Procedures  Procedures         ED Course  ED Course as of 04/24/23 0415   Mon Apr 24, 2023   0300 Evaluation by Crisis and conference with group home staff, affirms patient is under supervision, her behaviors are typical and manipulative, claiming                                SBIRT 22yo+    Flowsheet Row Most Recent Value   Initial Alcohol Screen: US AUDIT-C     1  How often do you have a drink containing alcohol? 0 Filed at: 04/23/2023 2328   2  How many drinks containing alcohol do you have on a typical day you are drinking? 0 Filed at: 04/23/2023 2328   3a  Male UNDER 65: How often do you have five or more drinks on one occasion? 0 Filed at: 04/23/2023 2328   3b  FEMALE Any Age, or MALE 65+: How often do you have 4 or more drinks on one occassion?  0 Filed at: 04/23/2023 2328   Audit-C Score 0 Filed at: 04/23/2023 2328   TIANA: How many times in the past year have you    Used an illegal drug or used a prescription medication for non-medical reasons? Never Filed at: 04/23/2023 2328                    MDM    Disposition  Final diagnoses:   Impulse disorder     Time reflects when diagnosis was documented in both MDM as applicable and the Disposition within this note     Time User Action Codes Description Comment    4/24/2023  3:03 AM Tai Ren [F63 9] Impulse disorder       ED Disposition     ED Disposition   Discharge    Condition   Good    Date/Time   Mon Apr 24, 2023  3:01 AM    Comment   Cecile Guzmán discharge to home/self care  Follow-up Information    None         There are no discharge medications for this patient  No discharge procedures on file      PDMP Review     None          ED Provider  Electronically Signed by           Monique Nichols MD  04/24/23 1246

## 2023-04-24 NOTE — ED NOTES
This writer placed a call to [employer] at [phone] and received their    was already aware of patient being in the ED but did not have any staff available who are regularly on the floor with patient who would be able to give collateral   did provide this writer with the phone number for a Allyson Waite who is their Residential Care Coordinator at [phone]   did also ask if the patient was going to be discharged to which this writer confirmed as patient seems to be at her baseline and does not present as an immediate danger to herself or others that would require emergency, inpatient intervention   states this is no problem and that once patient is ready to leave, to give him a call back and he will find someone to send to pick the patient up  Call placed to Allyson Waite at this time, rang twice and then reached her voicemail  A message was left asking Jody Jiménez to return a callback to this writer at her earliest convenience  Callback number left as well
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
## 2023-04-24 NOTE — ED NOTES
Group home made aware of patient's discharge disposition  States they will send someone out to  patient and that they should be here momentarily  Still no callback from  Sher at this time

## 2023-04-24 NOTE — ED NOTES
Patient given resources for therapy/counseling services with discharge papers  This writer will also reach out to our ED case manager to see if there are any additional community supports that can be provided for patient at this time

## 2023-04-24 NOTE — DISCHARGE INSTRUCTIONS
This writer discussed the patients current presentation and recommended discharge plan with Dr Anmol Mcfadden  They agree with the patient being discharged at this time with referrals and/or information about outpatient resources  The patient was instructed to follow up with their primary care physician and outpatient psychiatrist    The patient was provided with referral information for: outpatient resources  In addition, the patient was instructed to call local [facility] crisis, other crisis services, 911 or to go to the nearest ER immediately if their situation changes at any time  This writer discussed discharge plans with the patient and staff who agrees with and understands the discharge plans             National Suicide Prevention Hotline:  [address] 1-508-708-726-413-0500 - LVF Crisis/Mobile Crisis   [address] [facility]: [phone]  [facility]: [address] [phone] - Crisis   [phone] - Peer Support Talk Line (1-9pm daily)  [phone] - Teen Support Talk Line (1-9pm daily)  [address] [place] [address] ([place]) [phone] - [facility]

## 2023-05-22 ENCOUNTER — HOSPITAL ENCOUNTER (EMERGENCY)
Facility: HOSPITAL | Age: 38
Discharge: HOME | End: 2023-05-23
Attending: EMERGENCY MEDICINE

## 2023-05-22 VITALS
RESPIRATION RATE: 14 BRPM | TEMPERATURE: 98.2 F | HEART RATE: 88 BPM | SYSTOLIC BLOOD PRESSURE: 108 MMHG | DIASTOLIC BLOOD PRESSURE: 61 MMHG | OXYGEN SATURATION: 92 %

## 2023-05-22 DIAGNOSIS — R45.1 AGITATION: Primary | ICD-10-CM

## 2023-05-22 DIAGNOSIS — F32.A DEPRESSION: ICD-10-CM

## 2023-05-22 LAB — ETHANOL EXG-MCNC: 0 MG/DL

## 2023-05-22 RX ORDER — BACITRACIN, NEOMYCIN, POLYMYXIN B 400; 3.5; 5 [USP'U]/G; MG/G; [USP'U]/G
1 OINTMENT TOPICAL ONCE
Status: COMPLETED | OUTPATIENT
Start: 2023-05-22 | End: 2023-05-22

## 2023-05-22 RX ADMIN — BACITRACIN ZINC, NEOMYCIN, POLYMYXIN B SULFAT 1 SMALL APPLICATION: 5000; 3.5; 4 OINTMENT TOPICAL at 22:22

## 2023-05-23 NOTE — ED NOTES
This writer discussed the patients current presentation and recommended discharge plan with Dr Emily Lee  They agree with the patient being discharged at this time to group home staff and 24hr supervision  The patient was Instructed to follow up with their [place] [id number] and Psychiatry  This writer and the patient completed a safety plan  The patient was provided with a copy of their safety plan with encouragement to utilize the plan following discharge  In addition, the patient was instructed to call local [facility] crisis, other crisis services, 911 or to go to the nearest ER immediately if their situation changes at any time  Discussion was held with the patient and , regarding the process of completing a 36 petition in their county if necessary  This writer discussed discharge plans with the patient and , who agrees with and understands the discharge plans  SAFETY PLAN  Warning Signs (thoughts, images, mood, behavior, situations) of a potential crisis: Being aware of my triggers: Things people say to me, my own negative thoughts, being alone, lack of sleep and down time  Coping Skills (what can I do to take my mind off the problem, or to keep myself safe): drink more water, getting enough sleep, getting outside going for walks, socializing  Stop locking my door and fidget toys and pop it  Outside Support (who can I reach out to for support and help):  Behavioral Specialist, group home staff        National Suicide Prevention Hotline:  [address] 6-091-713-644-581-0626 - LVF Crisis/Mobile Crisis   [phone] - [address]: [facility]: [address] [facility] [phone] - Crisis   [phone] - Peer [facility] (1-9pm daily)  [phone] - Teen Support Talk Line (1-9pm daily)  [phone] [facility] [place] [address] ([place]) [phone] - [address] [facility]

## 2023-05-23 NOTE — ED PROVIDER NOTES
"History  Chief Complaint   Patient presents with   • Psychiatric Evaluation     Arrives with EMS after having anger outburst at group home and running away  States SI with ongoing hx of same  45-year-old female presents after having a \"rage attack\"  She reports that she became angry with staff workers in her group home because they were insisting that they entered her room and were accusing her of cheeking her medications  She states that she became angry and was punching walls and then attempted to run away  She reports that she is having recurrent thoughts of self-harm which have improved at this time  Psychiatric Evaluation  Presenting symptoms: aggressive behavior and agitation    Degree of incapacity (severity):  Severe  Onset quality:  Sudden  Duration: minutes  Timing:  Intermittent  Progression:  Resolved  Chronicity:  Recurrent      None       History reviewed  No pertinent past medical history  History reviewed  No pertinent surgical history  History reviewed  No pertinent family history  I have reviewed and agree with the history as documented  E-Cigarette/Vaping     E-Cigarette/Vaping Substances          Review of Systems   Psychiatric/Behavioral: Positive for agitation  All other systems reviewed and are negative  Physical Exam  Physical Exam  Vitals and nursing note reviewed  Constitutional:       General: Cecile Guzmán is not in acute distress  Appearance: Normal appearance  Cecile Guzmán is well-developed  Cecile Guzmán is not ill-appearing, toxic-appearing or diaphoretic  HENT:      Head: Normocephalic and atraumatic  Right Ear: External ear normal       Left Ear: External ear normal       Nose: Nose normal       Mouth/Throat:      Mouth: Mucous membranes are moist       Pharynx: Oropharynx is clear  Eyes:      Conjunctiva/sclera: Conjunctivae normal       Pupils: Pupils are equal, round, and reactive to light     Cardiovascular:      Rate " and Rhythm: Normal rate and regular rhythm  Heart sounds: Normal heart sounds  Pulmonary:      Effort: Pulmonary effort is normal  No respiratory distress  Breath sounds: Normal breath sounds  Abdominal:      General: Bowel sounds are normal  There is no distension  Palpations: Abdomen is soft  Tenderness: There is no abdominal tenderness  There is no guarding  Musculoskeletal:         General: Normal range of motion  Cervical back: Neck supple  No rigidity  Right lower leg: No edema  Left lower leg: No edema  Skin:     General: Skin is warm and dry  Capillary Refill: Capillary refill takes less than 2 seconds  Neurological:      General: No focal deficit present  Mental Status: Cecile Cohen is alert and oriented to person, place, and time  Psychiatric:         Mood and Affect: Mood normal  Affect is flat  Speech: Speech normal          Behavior: Behavior normal  Behavior is cooperative  Thought Content: Thought content includes suicidal ideation           Vital Signs  ED Triage Vitals [05/22/23 2137]   Temperature Pulse Respirations Blood Pressure SpO2   98 2 °F (36 8 °C) 88 14 108/61 92 %      Temp Source Heart Rate Source Patient Position - Orthostatic VS BP Location FiO2 (%)   Oral Monitor Lying Left arm --      Pain Score       --           Vitals:    05/22/23 2137   BP: 108/61   Pulse: 88   Patient Position - Orthostatic VS: Lying         Visual Acuity      ED Medications  Medications   neomycin-bacitracin-polymyxin b (NEOSPORIN) ointment 1 small application (1 small application Topical Given 5/22/23 2222)       Diagnostic Studies  Results Reviewed     Procedure Component Value Units Date/Time    POCT alcohol breath test [[id number]]  (Normal) Resulted: 05/22/23 2206    Lab Status: Final result Updated: 05/22/23 2206     EXTBreath Alcohol 0 000                 No orders to display              Procedures  Procedures         ED Course                                             Medical Decision Making  74-year-old female presents after becoming agitated in her group home  She had made suicidal threats  In reviewing the past medical records, the patient does appear to be at her baseline functional status  Discussed the case with the crisis worker  Patient has been cleared for discharge and we are awaiting an individual from the group home to return for her  She will be following up as an outpatient  Amount and/or Complexity of Data Reviewed  Labs: ordered  Risk  OTC drugs  Decision regarding hospitalization  Disposition  Final diagnoses:   None     ED Disposition     ED Disposition   Transfer to Behavioral Health Condition   --    Date/Time   Tue May 23, 2023 12:43 AM    Comment   Cecile Guzmán should be transferred out to behavioral health and has been medically cleared  Follow-up Information    None         Patient's Medications    No medications on file       No discharge procedures on file      PDMP Review     None          ED Provider  Electronically Signed by           Elli Vences DO  05/23/23 9418

## 2023-06-02 ENCOUNTER — HOSPITAL ENCOUNTER (EMERGENCY)
Facility: HOSPITAL | Age: 38
Discharge: HOME | End: 2023-06-02
Attending: EMERGENCY MEDICINE | Admitting: EMERGENCY MEDICINE
Payer: COMMERCIAL

## 2023-06-02 VITALS
SYSTOLIC BLOOD PRESSURE: 117 MMHG | WEIGHT: 220.24 LBS | DIASTOLIC BLOOD PRESSURE: 85 MMHG | HEART RATE: 85 BPM | BODY MASS INDEX: 35.4 KG/M2 | OXYGEN SATURATION: 96 % | TEMPERATURE: 98.6 F | RESPIRATION RATE: 18 BRPM | HEIGHT: 66 IN

## 2023-06-02 DIAGNOSIS — R45.851 SUICIDAL IDEATION: Primary | ICD-10-CM

## 2023-06-02 PROCEDURE — 99284 EMERGENCY DEPT VISIT MOD MDM: CPT

## 2023-06-03 ENCOUNTER — HOSPITAL ENCOUNTER (EMERGENCY)
Facility: HOSPITAL | Age: 38
Discharge: HOME | End: 2023-06-03
Attending: EMERGENCY MEDICINE
Payer: COMMERCIAL

## 2023-06-03 VITALS
SYSTOLIC BLOOD PRESSURE: 97 MMHG | HEART RATE: 79 BPM | OXYGEN SATURATION: 97 % | RESPIRATION RATE: 17 BRPM | TEMPERATURE: 97.7 F | DIASTOLIC BLOOD PRESSURE: 56 MMHG

## 2023-06-03 DIAGNOSIS — Z00.8 ENCOUNTER FOR PSYCHOLOGICAL EVALUATION: Primary | ICD-10-CM

## 2023-06-03 LAB
AMPHETAMINES SERPL QL SCN: NEGATIVE
BACTERIA UR QL AUTO: ABNORMAL /HPF
BARBITURATES UR QL: NEGATIVE
BENZODIAZ UR QL: NEGATIVE
BILIRUB UR QL STRIP: NEGATIVE
CLARITY UR: CLEAR
COCAINE UR QL: NEGATIVE
COLOR UR: YELLOW
ETHANOL EXG-MCNC: 0 MG/DL
EXT PREGNANCY TEST URINE: NEGATIVE
EXT. CONTROL: NORMAL
GLUCOSE UR STRIP-MCNC: NEGATIVE MG/DL
HGB UR QL STRIP.AUTO: NEGATIVE
KETONES UR STRIP-MCNC: NEGATIVE MG/DL
LEUKOCYTE ESTERASE UR QL STRIP: ABNORMAL
METHADONE UR QL: NEGATIVE
NITRITE UR QL STRIP: NEGATIVE
NON-SQ EPI CELLS URNS QL MICRO: ABNORMAL /HPF
OPIATES UR QL SCN: NEGATIVE
OXYCODONE+OXYMORPHONE UR QL SCN: NEGATIVE
PCP UR QL: NEGATIVE
PH UR STRIP.AUTO: 7 [PH]
PROT UR STRIP-MCNC: NEGATIVE MG/DL
RBC #/AREA URNS AUTO: ABNORMAL /HPF
SP GR UR STRIP.AUTO: 1.02
THC UR QL: NEGATIVE
UROBILINOGEN UR QL STRIP.AUTO: 0.2 E.U./DL
WBC #/AREA URNS AUTO: ABNORMAL /HPF

## 2023-06-03 PROCEDURE — 81001 URINALYSIS AUTO W/SCOPE: CPT | Performed by: EMERGENCY MEDICINE

## 2023-06-03 PROCEDURE — 99284 EMERGENCY DEPT VISIT MOD MDM: CPT

## 2023-06-03 PROCEDURE — 80307 DRUG TEST PRSMV CHEM ANLYZR: CPT | Performed by: EMERGENCY MEDICINE

## 2023-06-03 PROCEDURE — 82075 ASSAY OF BREATH ETHANOL: CPT | Performed by: EMERGENCY MEDICINE

## 2023-06-03 PROCEDURE — G0425 INPT/ED TELECONSULT30: HCPCS | Performed by: GENERAL PRACTICE

## 2023-06-03 PROCEDURE — 81025 URINE PREGNANCY TEST: CPT | Performed by: EMERGENCY MEDICINE

## 2023-06-03 NOTE — ED NOTES
"Crisis met with Patient in [facility]  Patient was seen by [facility] CASTILLO Crisis last night after wrapping a cord around her neck  Patient was eventually cleared and discharged back to her group home  Patient was immediately brought to this ED via staff  Patient is currently denying any suicidal/homicial ideations  Patient reports experiencing passive suicidal is her baseline and wrapping the cord around her neck was not a suicide attempt  Patient was calm and cooperative while meeting with Crisis  Patient said she doesn't understand why she is at another ED and just wants to go home and get some sleep  Patient contracted for safety several times throughout the completion of the assessment  Patient denied any recent increase in depression and anxiety  She denied any racing thoughts and any visual/auditory hallucinations  Patient reports an increase in sleep but is not concerned about it  She said she has a history of multiple suicide attempts but has not made more than a gesture in [place] long time  \"  Patient is requesting to be discharged home at this time  She is agreeable to a psych consult    Crisis to f/u  "
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
## 2023-06-03 NOTE — ED PROVIDER NOTES
"History  Chief Complaint   Patient presents with   • Suicidal     Pt reports she is SI and HI  States she has a plan to hang herself and reports she looked up guns and where to buy them  Also hang herself over the interpass  Pt also reports she wants to burn the staff of her group home     Patient is a 30-year-old female presenting from a group home for psychiatric evaluation  According to staff, patient continues to make suicidal comments and threatening to hurt herself  Per the nurse who took the triage, the patient reports that she is suicidal and homicidal   She says she has a \"plan to hang herself\" and reports that she \"looked up guns and where to buy them  \"  The patient was seen earlier tonight at [facility] for similar symptoms  According to notes, she attempted to hang herself with a videogame cord  After discussion with crisis and the patient's group home, it was decided the patient was safe for discharge as they thought this was \"attention seeking\" and that she has a one-to-one 24/7  When asked the patient why she is here she is not sure  She denies any SI or HI at this time for me  I spoke with the patient's  from the group home  He says that she continues to make suicidal threats and that she is a danger to herself  Concerned that she keeps running outside to run into traffic  According to chart review, the patient has been seen 5 times times since May 2 for suicidal ideation  None       History reviewed  No pertinent past medical history  History reviewed  No pertinent surgical history  History reviewed  No pertinent family history  I have reviewed and agree with the history as documented      E-Cigarette/Vaping     E-Cigarette/Vaping Substances     Social History     Tobacco Use   • Smoking status: Never   • Smokeless tobacco: Never   Substance Use Topics   • Alcohol use: Never   • Drug use: Never       Review of Systems   Constitutional: Negative for fever " and unexpected weight change  HENT: Negative for congestion, ear pain, sore throat and trouble swallowing  Eyes: Negative for pain and redness  Respiratory: Negative for cough, chest tightness and shortness of breath  Cardiovascular: Negative for chest pain and leg swelling  Gastrointestinal: Negative for abdominal distention, abdominal pain, diarrhea and vomiting  Endocrine: Negative for polyuria  Genitourinary: Negative for dysuria, hematuria, pelvic pain and vaginal bleeding  Musculoskeletal: Negative for back pain and myalgias  Skin: Negative for rash  Neurological: Negative for dizziness, syncope, weakness, light-headedness and headaches  Psychiatric/Behavioral: Negative for suicidal ideas  Physical Exam  Physical Exam  Vitals and nursing note reviewed  Constitutional:       General: Cecile Guzmán is not in acute distress  Appearance: Cecile Guzmán is well-developed  HENT:      Head: Normocephalic and atraumatic  Right Ear: External ear normal       Left Ear: External ear normal       Nose: Nose normal       Mouth/Throat:      Mouth: Mucous membranes are moist       Pharynx: No oropharyngeal exudate  Eyes:      Conjunctiva/sclera: Conjunctivae normal       Pupils: Pupils are equal, round, and reactive to light  Cardiovascular:      Rate and Rhythm: Normal rate and regular rhythm  Heart sounds: Normal heart sounds  No murmur heard  No friction rub  No gallop  Pulmonary:      Effort: Pulmonary effort is normal  No respiratory distress  Breath sounds: Normal breath sounds  No wheezing or rales  Abdominal:      General: There is no distension  Palpations: Abdomen is soft  Tenderness: There is no abdominal tenderness  There is no guarding  Musculoskeletal:         General: No swelling, tenderness or deformity  Normal range of motion  Cervical back: Normal range of motion and neck supple     Lymphadenopathy:      Cervical: No cervical adenopathy  Skin:     General: Skin is warm and dry  Neurological:      General: No focal deficit present  Mental Status: Cecile Copeland is alert and oriented to person, place, and time  Mental status is at baseline  Cranial Nerves: No cranial nerve deficit  Sensory: No sensory deficit  Motor: No weakness or abnormal muscle tone  Coordination: Coordination normal    Psychiatric:         Attention and Perception: Attention normal          Mood and Affect: Mood normal          Speech: Speech normal          Behavior: Behavior normal          Thought Content: Thought content does not include homicidal or suicidal ideation  Thought content does not include homicidal or suicidal plan  Cognition and Memory: Cognition normal          Vital Signs  ED Triage Vitals [06/03/23 0106]   Temperature Pulse Respirations Blood Pressure SpO2   97 7 °F (36 5 °C) 79 17 97/56 97 %      Temp Source Heart Rate Source Patient Position - Orthostatic VS BP Location FiO2 (%)   Temporal Monitor Lying Left arm --      Pain Score       No Pain           Vitals:    06/03/23 0106   BP: 97/56   Pulse: 79   Patient Position - Orthostatic VS: Lying         Visual Acuity      ED Medications  Medications - No data to display    Diagnostic Studies  Results Reviewed     Procedure Component Value Units Date/Time    Rapid drug screen, urine [[id number]]     Lab Status: No result Specimen: Urine     UA (URINE) with reflex to Scope [[id number]]     Lab Status: No result Specimen: Urine     POCT pregnancy, urine [[id number]]     Lab Status: No result     POCT alcohol breath test [[id number]]     Lab Status: No result                  No orders to display              Procedures  Procedures         ED Course                                             Medical Decision Making  31-year-old female with a history of schizoaffective, bipolar, presenting for psychiatric evaluation    Made suicidal threats at group home earlier tonight and supposedly attempted suicide with a videogame cord     According to notes this is a common occurrence for the patient  Was seen at [address] 3 hours prior to arrival here and was discharged  I spoke with the  who says that he is not able to take the patient back at this time given the continued threats  Will obtain crisis evaluation  Seeing as this is an ongoing issue with the patient, ultimate disposition will depend on conversation between crisis and the group home        Disposition  Final diagnoses:   Encounter for psychological evaluation     Time reflects when diagnosis was documented in both MDM as applicable and the Disposition within this note     Time User Action Codes Description Comment    6/3/2023  5:28 AM Cleola Spurling Add [Z00 8] Encounter for psychological evaluation       ED Disposition     ED Disposition   Transfer to [address] [place] [address]   --    Date/Time   Sat Zeyad 3, 2023  5:28 AM    Comment   Cecile Guzmán should be transferred out to D and has been medically cleared  Follow-up Information    None         Patient's Medications    No medications on file       No discharge procedures on file      PDMP Review     None          ED Provider  Electronically Signed by           Flori Ortez DO  06/03/23 0500       Flori Ortez DO  06/03/23 0530

## 2023-06-03 NOTE — CONSULTS
TeleConsultation - Cristine Guzmán 30 y o  adult MRN: [id number]  Unit/Bed#: 31 [address]ico[address] 02 Encounter: [id number]        REQUIRED DOCUMENTATION:     1  This service was provided via Telemedicine  2  Provider located at [facility]   3  TeleMed provider: Pooja Scruggs MD   4  Identify all parties in room with patient during tele consult: Patient   5  Patient was then informed that this was a Telemedicine visit and that the exam was being conducted confidentially over secure lines  My office door was closed  No one else was in the room  Patient acknowledged consent and understanding of privacy and security of the Telemedicine visit, and gave us permission to have the assistant stay in the room in order to assist with the history and to conduct the exam   I informed the patient that I have reviewed their record in Epic and presented the opportunity for them to ask any questions regarding the visit today  The patient agreed to participate  Discussed with Dr Gee Salcedo    Assessment/Plan     Present on Admission:  **None**    Assessment:    Borderline Personality Disorder     Patient presents with signs and symptoms consistent with borderline personality disorder presenting with acute on chronic suicidal ideation in the setting of acute stressors that is since resolved  Patient currently does not represent acute or imminent risk of harm to self or others warranting involuntary inpatient psychiatric admission and is safe for discharge home with outpatient follow-up  Treatment Plan:    Planned Medication Changes:    -None     Current Medications:         Risks / Benefits of Treatment:    Risks, benefits, and possible side effects of medications explained to patient and patient verbalizes understanding        Other treatment modalities recommended as indicated:    · psychotherapy      Consults  Physician Requesting Consult: Michelle Coello DO  Principal Problem:<principal problem not specified>    Reason for Consult: Psych Evaluation       History of Present Illness        Per Crisis Note by Marissa Mays Fearing from [facility] called Crisis requesting an update on Patient  Tonya Jaramillo stated Patient was brought to [facility] ED for an eval after wrapping a cord around her neck yesterday afternoon  During her eval ir was determined Patient's behavior was attention seeking and this was her baseline  Tonya Jaramillo stated Patient sent a text stating she wanted to hang herself from a local overpass before she it was determined Patient was cleared for discharge at [facility]  This was reportedly shared with staff at [facility] and was reviewed prior to discharge  Tonya Jaramillo said the group home was not pleased with the determination and despite not making any other suicidal statements brought Patient to [place] [address] for another eval   Tonya Carrasquilloucier explained the group home had not had luck getting some residents admitted at [facility] but were able to get them evaluated again at [place] [address] where they were eventually hospitalized  Crisis explained Patient will be evaluated by the psychiatrist shortly and a determination will be made at that time  Tonya Clint said Patient is welcome back at the group home either way  Crisis to f/u    Crisis met with Patient in [facility]  Patient was seen by ELISEO CASTILLO Crisis last night after wrapping a cord around her neck  Patient was eventually cleared and discharged back to her group home  Patient was immediately brought to this ED via staff  Patient is currently denying any suicidal/homicial ideations  Patient reports experiencing passive suicidal is her baseline and wrapping the cord around her neck was not a suicide attempt  Patient was calm and cooperative while meeting with Crisis  Patient said she doesn't understand why she is at another ED and just wants to go home and get some sleep  Patient contracted for safety several times throughout the completion of the assessment    Patient denied any recent "increase in depression and anxiety  She denied any racing thoughts and any visual/auditory hallucinations  Patient reports an increase in sleep but is not concerned about it  She said she has a history of multiple suicide attempts but has not made more than a gesture in [place] long time  \"  Patient is requesting to be discharged home at this time  She is agreeable to a psych consult  Crisis to f/u  Patient states that she has no idea why she is in the hospital stating that her GF brought her in for threatening to harm herself  Patient states that she has suicidal thoughts but her passive frequently but has no current suicidal or homicidal ideation  Patient states that she feels safe not endorsing any active psychiatric symptoms  Patient without any indication for voluntary or involuntary IP psychiatric admission  Psychiatric Review Of Systems:    sleep: no  appetite changes: no  weight changes: no  energy/anergy: no  interest/pleasure/anhedonia: no  somatic symptoms: no  anxiety/panic: no  tennille: no  guilty/hopeless: no  self injurious behavior/risky behavior: no    Historical Information     Past Psychiatric History:     Psychiatric Hospitalizations:   • Several past inpatient psychiatric admissions  Outpatient Treatment History:   • Yes  Suicide Attempts:   • Yes, Several attempts by overdose  History of self-harm:   • None  Violence History:   • no  Past Psychiatric medication trials: Unable to recall     Substance Abuse History: Denied         Family Psychiatric History:  Yes, Maternal Aunt          Social History:    Education: high school diploma/GED  Learning Disabilities: Denied  Marital history: single  Living arrangement, social support: The patient lives in a group home under the supervision of Unsure  Occupational History: on permanent disability  Functioning Relationships: good support system    Other Pertinent History: None    Traumatic History:     Abuse: None  Other Traumatic Events: " "none    History reviewed  No pertinent past medical history      Medical Review Of Systems:    Review of Systems    Meds/Allergies     all current active meds have been reviewed  Allergies   Allergen Reactions   • Bee Venom Anaphylaxis   • Duloxetine Shortness Of Breath     \"Trouble breathing\"   Asthma attacks     • Peanut Oil - Food Allergy Anaphylaxis   • Banana Extract Allergy Skin Test - Food Allergy GI Intolerance     vomiting   • Caffeine - Food Allergy GI Intolerance     vomiting   • Ciprofloxacin Other (See Comments)   • Citrus - Food Allergy Hives     \"Irritation\"     • Diclofenac Sodium Hives   • Omeprazole Other (See Comments)   • Tilactase GI Bleeding     Not an allergy according to patient   • Triamcinolone Other (See Comments)   • Amoxicillin Rash   • Aripiprazole Rash and Other (See Comments)     Extreme shakes     • Benzonatate Rash   • Nitrofurantoin GI Intolerance and Rash   • Penicillins Rash       Objective     Vital signs in last 24 hours:  Temp:  [97 7 °F (36 5 °C)] 97 7 °F (36 5 °C)  HR:  [79] 79  Resp:  [17] 17  BP: (97)/(56) 97/56    No intake or output data in the 24 hours ending 06/03/23 1404    Mental Status Evaluation:    Appearance:  age appropriate   Behavior:  normal   Speech:  normal pitch   Mood:  normal   Affect:  normal   Language: naming objects   Thought Process:  normal   Associations intact associations   Thought Content:  normal   Perceptual Disturbances: None   Risk Potential: Suicidal Ideations none  Homicidal Ideations none  Potential for Aggression No   Sensorium:  person, place and time/date   Cognition:  recent and remote memory grossly intact   Consciousness:  alert    Attention: attention span and concentration were age appropriate   Intellect: within normal limits   Fund of Knowledge: awareness of current events: President   Insight:  limited   Judgment: limited   Muscle Strength:  Muscle Tone: normal NFT  normal   Gait/Station: normal gait/station   Motor Activity: " "no abnormal movements       Lab Results: I have personally reviewed all pertinent laboratory/tests results  Most Recent Labs:   Lab Results   Component Value Date    ALB 3 2 (L) 12/31/2022    ALKPHOS 80 12/31/2022    ALT 27 12/31/2022    AST 18 12/31/2022    BUN 14 03/05/2023    CALCIUM 9 2 03/05/2023     03/05/2023    CO2 31 03/05/2023    CREATININE 1 00 03/05/2023    GLUC 116 03/05/2023    HCT 37 7 03/05/2023    HGB 12 3 03/05/2023    K 3 4 (L) 03/05/2023    NEUTROABS 3 09 03/05/2023     03/05/2023    RBC 3 86 (L) 03/05/2023    RDW 14 0 03/05/2023    SODIUM 140 03/05/2023    TBILI 0 20 12/31/2022    TP 6 7 12/31/2022    WBC 5 63 03/05/2023       Imaging Studies: XR shoulder 2+ vw right    Result Date: 5/5/2023  Narrative: History: Acute pain right shoulder Study: 3 views right shoulder  Comparison: None    Impression: Findings/impression: Acromioclavicular and glenohumeral joints are normal with no acute fracture or malalignment  Two 2-3mm foci of calcifications at the posterior greater tuberosity consistent with calcific tendinitis and/or bursitis  The status of the cuff and bursa would be better assessed on an MRI  [id number]    EKG/Pathology/Other Studies: No results found for: \"ATRIALRATE\", \"PAXIS\", \"PRINT\", \"QRSAXIS\", \"QRSDINT\", \"QTCINT\", \"QTINT\", \"TWAVEAXIS\", \"VENTRATE\"     Code Status: No Order  Advance Directive and Living Will:      Power of :    POLST:      Screenings:    1  Nutrition Screening  Nutrition Assessment (completed by Staff): Nutrition  Appetite: Good    2  Pain Screening  Pain Screening: Pain Assessment  Pain Assessment Tool: 0-10  Pain Score: 0    3  Suicide Screening  ED Crisis Suicide Risk Assessment: Suicide Risk Assessment  Violence Risk to Self: Yes- Within the past 6 months  Description of self harming behaviors or thoughts[de-identified] long history of suicide attemptsa  Protective Factors:  The patient has desire to live, The patient does not want to die, The " patient has no thoughts of suicide      Counseling / Coordination of Care: Total floor / unit time spent today 30 minutes  Greater than 50% of total time was spent with the patient and / or family counseling and / or coordination of care  A description of the counseling / coordination of care: Direct Patient Care, Chart Review, and Documentation

## 2023-06-03 NOTE — ED NOTES
Mallory from [address] [place] [address] called Crisis requesting an update on Patient  Jayme eLo stated Patient was brought to [facility] ED for an eval after wrapping a cord around her neck yesterday afternoon  During her eval ir was determined Patient's behavior was attention seeking and this was her baseline  Jayme Leo stated Patient sent a text stating she wanted to hang herself from a local overpass before she it was determined Patient was cleared for discharge at [facility]  This was reportedly shared with staff at [facility] and was reviewed prior to discharge  Jayme Leo said the group home was not pleased with the determination and despite not making any other suicidal statements brought Patient to [place] [address] for another eval   Jayme Leo explained the group home had not had luck getting some residents admitted at [facility] but were able to get them evaluated again at [place] [address] where they were eventually hospitalized  Crisis explained Patient will be evaluated by the psychiatrist shortly and a determination will be made at that time  Jayme Leo said Patient is welcome back at the group home either way    Crisis to f/u

## 2023-06-03 NOTE — DISCHARGE INSTRUCTIONS
This writer discussed the patients current presentation and recommended discharge plan with CHAGO Collins  They agree with the patient being discharged at this time with the plan for the patient to follow up with existing psychiatrist, treatment team    The patient was Instructed to follow up with their PCP; Linda Edmond  The patient was provided with encouragement to utilize the safety plan established by her treatment team following discharge  An additional blank safety plan was provided for future use  The patient was encouraged to utilize her existing coping skills, open dialogue with her treatment team and express her needs to those supporting her in an appropriate manner  SAFETY PLAN  Warning Signs (thoughts, images, mood, behavior, situations) of a potential crisis:       Coping Skills (what can I do to take my mind off the problem, or to keep myself safe):        Outside Support (who can I reach out to for support and help):         National Suicide Prevention Hotline:  [address]: [facility]: [facility] [address] [phone] - Crisis   [phone] - Peer [address] (1-9pm daily)  [phone] - Teen Support Talk Line (1-9pm daily)  [address] [place] [address] [facility] [phone] - [facility]

## 2023-06-03 NOTE — ED NOTES
Pt was brought in by  due to pt expressing SI and HI thoughts  Care manager reports they came right from [facility] due them discharging her and they would like inpatient care  Crisis determined she is attention seeking due to not getting what she wants at the home and the care manager cannot keep her there if she keeps expressing SI statements  Pt is calm and cooperate  States she has multiple plans such as hanging herself or shooting herself  States she also has thoughts of lighting her staff on fire  Pt reports she has been taking all of her medications       Stepan Broussard RN  06/03/23 7089

## 2023-06-03 NOTE — ED NOTES
Placed call to Suleiman Cantor who is their Residential Care Coordinator at [phone]  To notify that after psychiatry consult patient is to be discharged    She stated she is calling staff to come and get her

## 2023-06-03 NOTE — ED PROVIDER NOTES
History  Chief Complaint   Patient presents with   • Suicide Attempt     Arrives EMS from group home after attempt to hang self with video game cord  Patient states cord was tight but patient was not actually hanging and didn't say anything for a while  Patient states she also ran away from home with desire to hang self off overpass  Per patient, attempt stopped by police/ems who found her  Patient states shes been planning her suicide for past week and half  States she has been carrying too much weight on her shoulders lately (personal problems, family problems, and group h     37y  o female with no significant PMH presents to the ER for psychiatric evaluation  Patient reports being unhappy living at her group home  Patient states she has lived there since October and wants to go back to living with her mother  Patient states she became upset earlier today so she left the group home to go hang herself from an overpass  The group home staff followed the patient and called police who intercepted the patient and took her back to the group home  Patient states she was not able to act on her thoughts  Patient cannot identify why she is having suicidal ideations  When the patient arrived back at the group home, she spoke with staff at the group home and then called [facility] [place] ([facility] AT [place] to come to the ER  She denies HI  She reports auditory and visual hallucinations that she states she always has  She reports taking her medications as prescribed  She follows with psychiatry at [facility] in [place]  She was recently admitted to behavioral health  She denies drug, alcohol or tobacco use  She reports eating well and sleeping a lot  She denies fever, chills, URI symptoms, chest pain, dyspnea, N/V/D, abdominal pain, neck pain, weakness or paresthesias  History provided by:  Patient   used: No        None       History reviewed  No pertinent past medical history  History reviewed   No pertinent surgical history  History reviewed  No pertinent family history  I have reviewed and agree with the history as documented  E-Cigarette/Vaping     E-Cigarette/Vaping Substances          Review of Systems   Constitutional: Negative for activity change, appetite change, chills and fever  HENT: Negative for congestion, drooling, ear discharge, ear pain, facial swelling, rhinorrhea and sore throat  Eyes: Negative for redness  Respiratory: Negative for cough and shortness of breath  Cardiovascular: Negative for chest pain  Gastrointestinal: Negative for abdominal pain, diarrhea, nausea and vomiting  Musculoskeletal: Negative for neck pain and neck stiffness  Skin: Negative for rash  Allergic/Immunologic: Positive for food allergies  Neurological: Negative for weakness and numbness  Psychiatric/Behavioral: Positive for hallucinations and suicidal ideas  Physical Exam  Physical Exam  Vitals and nursing note reviewed  Constitutional:       General: Cecile Guzmán is not in acute distress  Appearance: Cecile Guzmán is not toxic-appearing  HENT:      Head: Normocephalic and atraumatic  Eyes:      Conjunctiva/sclera: Conjunctivae normal    Neck:      Trachea: No tracheal deviation  Cardiovascular:      Rate and Rhythm: Normal rate  Pulmonary:      Effort: Pulmonary effort is normal  No respiratory distress  Abdominal:      General: There is no distension  Musculoskeletal:      Cervical back: Normal range of motion and neck supple  Skin:     General: Skin is warm and dry  Findings: No rash  Neurological:      Mental Status: Cecile Guzmán is alert  GCS: GCS eye subscore is 4  GCS verbal subscore is 5  GCS motor subscore is 6  Psychiatric:         Attention and Perception: Cecile Guzmán perceives auditory and visual hallucinations  Mood and Affect: Affect is flat           Speech: Speech normal          Behavior: Behavior is cooperative  Thought Content: Thought content includes suicidal ideation  Thought content does not include homicidal ideation  Thought content includes suicidal plan  Thought content does not include homicidal plan  Vital Signs  ED Triage Vitals [06/02/23 2134]   Temperature Pulse Respirations Blood Pressure SpO2   98 6 °F (37 °C) 85 18 117/85 96 %      Temp Source Heart Rate Source Patient Position - Orthostatic VS BP Location FiO2 (%)   Oral Monitor Lying Left arm --      Pain Score       3           Vitals:    06/02/23 2134   BP: 117/85   Pulse: 85   Patient Position - Orthostatic VS: Lying         Visual Acuity      ED Medications  Medications - No data to display    Diagnostic Studies  Results Reviewed     None                 No orders to display              Procedures  Procedures         ED Course                               SBIRT 22yo+    Flowsheet Row Most Recent Value   Initial Alcohol Screen: US AUDIT-C     1  How often do you have a drink containing alcohol? 0 Filed at: 06/02/2023 2147   2  How many drinks containing alcohol do you have on a typical day you are drinking? 0 Filed at: 06/02/2023 2147   3a  Male UNDER 65: How often do you have five or more drinks on one occasion? 0 Filed at: 06/02/2023 2147   3b  FEMALE Any Age, or MALE 65+: How often do you have 4 or more drinks on one occassion? 0 Filed at: 06/02/2023 2147   Audit-C Score 0 Filed at: 06/02/2023 2147   TIANA: How many times in the past year have you    Used an illegal drug or used a prescription medication for non-medical reasons? Never Filed at: 06/02/2023 2147                    Medical Decision Making  37y  o female presents to the ER for suicidal ideations  Vitals are stable  Patient is in no acute distress  On exam, breathing is non-labored  Abdomen is not distended  Patient reports AH and VH, which she always has   Her affect is flat but she becomes very defensive at times with questioning and seems annoyed with "staff  She is cooperative  She reports SI  She denies HI  Will have patient seen by Crisis  2300 - Patient seen by myself and Arlen Shelton from Crisis also called and spoke with the group home medical care coordinator Kip Segovia is aware of all of the events from today  Per the note from Crisis, \"Deja reports that the patient has never attempted to harm herself in the past, and is unaware of any documented instances in which she tried to end her life  Kei Segovia reports the patient has 1:1 staff 24/7, and access to an extensive treatment team daily  Kei Segovia reports that the patient does not have access to sharps, medications or chemicals  Kei Segovia reports the patient is diagnosed with Borderline personality disorder  Patient is able to return to the group home if discharged from the hospital Per chart review patient is known to make suicidal statements when required to abide by group home rules that she does not like or does not agree with  Patient reported she wants to go live with her mother, but per previous [facility] notes the patient has a tumultuous relationship with her mother, and is not able to return to that living arrangement  \" Chart reviewed and patient seen multiple times recently for similar symptoms at [facility]  18 - Spoke with patient about plan for discharge back to group [place] since group home staff is agreeable to take the patient back to facility due to this being patient's baseline behavior  Patient is agreeable to plan and is agreeable to follow up with her behavioral team at group [place]      The management plan was discussed in detail with the patient at bedside and all questions were answered  Prior to discharge, we provided both verbal and written instructions  We discussed with the patient the signs and symptoms for which to return to the emergency department  All questions were answered and patient was comfortable with the plan of care and discharged to home    Instructed the patient to " follow up with the primary care provider and/or specialist provided and their written instructions  The patient verbalized understanding of our discussion and plan of care, and agrees to return to the Emergency Department for concerns and progression of illness  At discharge, I instructed the patient to:  -follow up with pcp  -return to the ER if symptoms worsened or new symptoms arose  Patient agreed to this plan and was stable at time of discharge  Suicidal ideation: acute illness or injury  Amount and/or Complexity of Data Reviewed  Independent Historian: caregiver     Details: Patient and group home staff are historians          Disposition  Final diagnoses:   Suicidal ideation     Time reflects when diagnosis was documented in both MDM as applicable and the Disposition within this note     Time User Action Codes Description Comment    6/2/2023 11:17 PM Graciela Ren [S49 147] Suicidal ideation       ED Disposition     ED Disposition   Discharge    Condition   Stable    Date/Time   Fri Jun 2, 2023 11:17 PM    Comment   Cecile Guzmán discharge to home/self care  Follow-up Information     Follow up With Specialties Details Why Contact Info Additional [address]   [address], [address] [phone]  [address] [place] [address], [address], [address] [place], [place], [address] [place] [address]          There are no discharge medications for this patient  No discharge procedures on file      PDMP Review     None          ED Provider  Electronically Signed by           Monico Lemons PA-C  06/03/23 0011

## 2023-06-03 NOTE — DISCHARGE INSTRUCTIONS
If you develop any new or worsening symptoms please immediately return to your nearest emergency department  This writer discussed the patients current presentation and recommended discharge plan with Dr Wendy Bravo  They agree with the patient being discharged at this time to group home staff and 24hr supervision  The patient was Instructed to follow up with their [facility] and Psychiatry  This writer and the patient completed a safety plan  The patient was provided with a copy of their safety plan with encouragement to utilize the plan following discharge  In addition, the patient was instructed to call local [facility] crisis, other crisis services, [facility] or to go to the nearest ER immediately if their situation changes at any time  Discussion was held with the patient and , regarding the process of completing a [id number] petition in their county if necessary  This writer discussed discharge plans with the patient and , who agrees with and understands the discharge plans  SAFETY PLAN  Warning Signs (thoughts, images, mood, behavior, situations) of a potential crisis: Being aware of my triggers: Things people say to me, my own negative thoughts, being alone, lack of sleep and down time  Coping Skills (what can I do to take my mind off the problem, or to keep myself safe): drink more water, getting enough sleep, getting outside going for walks, socializing  Stop locking my door and fidget toys and pop it  Outside Support (who can I reach out to for support and help):  Behavioral Specialist, group home staff           National Suicide Prevention Hotline:  [address] [place] [address] 2-821-930-157-455-6635 - LVF Crisis/Mobile Crisis   [phone] - SLPF Crisis   [facility]: [phone]  [facility]: [place] [address] [phone] - Crisis   [phone] - Peer Support Talk Line (1-9pm daily)  [phone] - Teen Support Talk Line (1-9pm daily)  [address] ([place]) [phone] - [facility] [facility]

## 2023-06-03 NOTE — ED NOTES
This writer discussed the patients current presentation and recommended discharge plan with Dr Xochitl Browning  They agree with the patient being discharged at this time to group home staff and 24hr supervision       The patient was Instructed to follow up with their [facility] and Psychiatry       This writer and the patient completed a safety plan  The patient was provided with a copy of their safety plan with encouragement to utilize the plan following discharge       In addition, the patient was instructed to call local [facility] crisis, other crisis services, 911 or to go to the nearest ER immediately if their situation changes at any time       Discussion was held with the patient and , regarding the process of completing a 302 petition in their county if necessary      This writer discussed discharge plans with the patient and , who agrees with and understands the discharge plans       SAFETY PLAN  Warning Signs (thoughts, images, mood, behavior, situations) of a potential crisis: Being aware of my triggers: Things people say to me, my own negative thoughts, being alone, lack of sleep and down time      Coping Skills (what can I do to take my mind off the problem, or to keep myself safe): drink more water, getting enough sleep, getting outside going for walks, socializing  Stop locking my door and fidget toys and pop it        Outside Support (who can I reach out to for support and help):  Behavioral Specialist, group home staff           National Suicide Prevention Hotline:  [address] [place] [address]  6-621-870-087-152-0706 - LVF Crisis/Mobile Crisis   [address]: [facility] [place] [address]   [phone] - Peer Support Talk Line (1-9pm daily)  [phone] - Teen Support Talk Line (1-9pm daily)  [phone] [facility] [address] [place] [address] ([place]) [phone] - [facility] [facility]
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
## 2023-07-05 ENCOUNTER — PATIENT OUTREACH (OUTPATIENT)
Dept: CASE MANAGEMENT | Facility: OTHER | Age: 38
End: 2023-07-05
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
## 2024-02-13 NOTE — ED CARE HANDOFF
- hx recurrent UTIs but has been without one for 2 years  - afebrile without leykocytosis  - CT A/P negative for acute findings  - continue rocephine 1g q24hrs   - changed to macrobid based on previous cultures  - follow UCx  - low threshold to broaden abx given hx enterococcus UTIs in the past   Emergency Department Sign Out Note        Sign out and transfer of care from Dr Malloy Channel See Separate Emergency Department note  The patient, Cecile Guzmán, was evaluated by the previous provider for psych eval     Workup Completed:  Clearance labs    ED Course / Workup Pending (followup): Patient medically cleared by previous provider, 201 signed, patient signed out to me pending bed placement  Would require 302                                  ED Course as of 03/04/23 0119   Sat Mar 04, 2023   0003 Would need 302     Procedures  MDM        Disposition  Final diagnoses:   Depression     Time reflects when diagnosis was documented in both MDM as applicable and the Disposition within this note     Time User Action Codes Description Comment    3/3/2023  1:30 PM Eder Mota Add [F32  A] Depression       ED Disposition     ED Disposition   Transfer to Behavioral Health Condition   --    Date/Time   Fri Mar 3, 2023  1:30 PM    Comment   Cecile Guzmán should be transferred out to [facility] and has been medically cleared  MD Documentation    Flowsheet Row Most Recent Value   Sending MD Ariana Hickman      Follow-up Information    None       Patient's Medications    No medications on file     No discharge procedures on file         ED Provider  Electronically Signed by     Nidia Patterson MD  03/04/23 0120